# Patient Record
Sex: MALE | Race: BLACK OR AFRICAN AMERICAN | Employment: OTHER | URBAN - METROPOLITAN AREA
[De-identification: names, ages, dates, MRNs, and addresses within clinical notes are randomized per-mention and may not be internally consistent; named-entity substitution may affect disease eponyms.]

---

## 2019-10-28 ENCOUNTER — TELEPHONE ANTICOAG (OUTPATIENT)
Dept: CARDIOLOGY CLINIC | Age: 59
End: 2019-10-28

## 2020-02-02 ENCOUNTER — APPOINTMENT (OUTPATIENT)
Dept: CT IMAGING | Age: 60
End: 2020-02-02
Attending: EMERGENCY MEDICINE
Payer: MEDICARE

## 2020-02-02 ENCOUNTER — HOSPITAL ENCOUNTER (EMERGENCY)
Age: 60
Discharge: HOME | End: 2020-02-02
Attending: EMERGENCY MEDICINE | Admitting: EMERGENCY MEDICINE
Payer: MEDICARE

## 2020-02-02 VITALS
SYSTOLIC BLOOD PRESSURE: 144 MMHG | OXYGEN SATURATION: 98 % | TEMPERATURE: 97.9 F | BODY MASS INDEX: 27.77 KG/M2 | HEART RATE: 71 BPM | HEIGHT: 72 IN | RESPIRATION RATE: 17 BRPM | WEIGHT: 205.03 LBS | DIASTOLIC BLOOD PRESSURE: 84 MMHG

## 2020-02-02 DIAGNOSIS — R53.83 FATIGUE, UNSPECIFIED TYPE: Primary | ICD-10-CM

## 2020-02-02 DIAGNOSIS — R51.9 ACUTE NONINTRACTABLE HEADACHE, UNSPECIFIED HEADACHE TYPE: ICD-10-CM

## 2020-02-02 LAB
ALBUMIN SERPL-MCNC: 3.5 G/DL (ref 3.5–5)
ALBUMIN/GLOB SERPL: 1.2 {RATIO} (ref 1.1–2.2)
ALP SERPL-CCNC: 55 U/L (ref 45–117)
ALT SERPL-CCNC: 24 U/L (ref 12–78)
ANION GAP SERPL CALC-SCNC: 9 MMOL/L (ref 5–15)
APPEARANCE UR: CLEAR
AST SERPL-CCNC: 12 U/L (ref 15–37)
BACTERIA URNS QL MICRO: NEGATIVE /HPF
BASOPHILS # BLD: 0 K/UL (ref 0–0.1)
BASOPHILS NFR BLD: 0 % (ref 0–1)
BILIRUB SERPL-MCNC: 0.7 MG/DL (ref 0.2–1)
BILIRUB UR QL: NEGATIVE
BUN SERPL-MCNC: 12 MG/DL (ref 6–20)
BUN/CREAT SERPL: 9 (ref 12–20)
CALCIUM SERPL-MCNC: 8.5 MG/DL (ref 8.5–10.1)
CHLORIDE SERPL-SCNC: 104 MMOL/L (ref 97–108)
CO2 SERPL-SCNC: 25 MMOL/L (ref 21–32)
COLOR UR: ABNORMAL
COMMENT, HOLDF: NORMAL
CREAT SERPL-MCNC: 1.28 MG/DL (ref 0.7–1.3)
DIFFERENTIAL METHOD BLD: NORMAL
EOSINOPHIL # BLD: 0 K/UL (ref 0–0.4)
EOSINOPHIL NFR BLD: 0 % (ref 0–7)
EPITH CASTS URNS QL MICRO: ABNORMAL /LPF
ERYTHROCYTE [DISTWIDTH] IN BLOOD BY AUTOMATED COUNT: 13.1 % (ref 11.5–14.5)
GLOBULIN SER CALC-MCNC: 2.9 G/DL (ref 2–4)
GLUCOSE SERPL-MCNC: 276 MG/DL (ref 65–100)
GLUCOSE UR STRIP.AUTO-MCNC: 500 MG/DL
HCT VFR BLD AUTO: 38.3 % (ref 36.6–50.3)
HGB BLD-MCNC: 12.7 G/DL (ref 12.1–17)
HGB UR QL STRIP: ABNORMAL
IMM GRANULOCYTES # BLD AUTO: 0 K/UL (ref 0–0.04)
IMM GRANULOCYTES NFR BLD AUTO: 0 % (ref 0–0.5)
KETONES UR QL STRIP.AUTO: NEGATIVE MG/DL
LEUKOCYTE ESTERASE UR QL STRIP.AUTO: NEGATIVE
LYMPHOCYTES # BLD: 1.1 K/UL (ref 0.8–3.5)
LYMPHOCYTES NFR BLD: 18 % (ref 12–49)
MCH RBC QN AUTO: 29.6 PG (ref 26–34)
MCHC RBC AUTO-ENTMCNC: 33.2 G/DL (ref 30–36.5)
MCV RBC AUTO: 89.3 FL (ref 80–99)
MONOCYTES # BLD: 0.5 K/UL (ref 0–1)
MONOCYTES NFR BLD: 8 % (ref 5–13)
NEUTS SEG # BLD: 4.2 K/UL (ref 1.8–8)
NEUTS SEG NFR BLD: 74 % (ref 32–75)
NITRITE UR QL STRIP.AUTO: NEGATIVE
NRBC # BLD: 0 K/UL (ref 0–0.01)
NRBC BLD-RTO: 0 PER 100 WBC
PH UR STRIP: 6 [PH] (ref 5–8)
PLATELET # BLD AUTO: 153 K/UL (ref 150–400)
PMV BLD AUTO: 11 FL (ref 8.9–12.9)
POTASSIUM SERPL-SCNC: 3.2 MMOL/L (ref 3.5–5.1)
PROT SERPL-MCNC: 6.4 G/DL (ref 6.4–8.2)
PROT UR STRIP-MCNC: NEGATIVE MG/DL
RBC # BLD AUTO: 4.29 M/UL (ref 4.1–5.7)
RBC #/AREA URNS HPF: ABNORMAL /HPF (ref 0–5)
SAMPLES BEING HELD,HOLD: NORMAL
SODIUM SERPL-SCNC: 138 MMOL/L (ref 136–145)
SP GR UR REFRACTOMETRY: 1.01 (ref 1–1.03)
TSH SERPL DL<=0.05 MIU/L-ACNC: 0.82 UIU/ML (ref 0.36–3.74)
UR CULT HOLD, URHOLD: NORMAL
UROBILINOGEN UR QL STRIP.AUTO: 0.2 EU/DL (ref 0.2–1)
WBC # BLD AUTO: 5.8 K/UL (ref 4.1–11.1)
WBC URNS QL MICRO: ABNORMAL /HPF (ref 0–4)

## 2020-02-02 PROCEDURE — 85025 COMPLETE CBC W/AUTO DIFF WBC: CPT

## 2020-02-02 PROCEDURE — 70496 CT ANGIOGRAPHY HEAD: CPT

## 2020-02-02 PROCEDURE — 84443 ASSAY THYROID STIM HORMONE: CPT

## 2020-02-02 PROCEDURE — 99285 EMERGENCY DEPT VISIT HI MDM: CPT

## 2020-02-02 PROCEDURE — 74011250636 HC RX REV CODE- 250/636: Performed by: EMERGENCY MEDICINE

## 2020-02-02 PROCEDURE — 36415 COLL VENOUS BLD VENIPUNCTURE: CPT

## 2020-02-02 PROCEDURE — 70450 CT HEAD/BRAIN W/O DYE: CPT

## 2020-02-02 PROCEDURE — 74011636320 HC RX REV CODE- 636/320: Performed by: RADIOLOGY

## 2020-02-02 PROCEDURE — 70498 CT ANGIOGRAPHY NECK: CPT

## 2020-02-02 PROCEDURE — 81001 URINALYSIS AUTO W/SCOPE: CPT

## 2020-02-02 PROCEDURE — 74011000258 HC RX REV CODE- 258: Performed by: RADIOLOGY

## 2020-02-02 PROCEDURE — 74177 CT ABD & PELVIS W/CONTRAST: CPT

## 2020-02-02 PROCEDURE — 80053 COMPREHEN METABOLIC PANEL: CPT

## 2020-02-02 RX ORDER — SODIUM CHLORIDE 0.9 % (FLUSH) 0.9 %
10 SYRINGE (ML) INJECTION
Status: COMPLETED | OUTPATIENT
Start: 2020-02-02 | End: 2020-02-02

## 2020-02-02 RX ADMIN — IOPAMIDOL 80 ML: 755 INJECTION, SOLUTION INTRAVENOUS at 17:06

## 2020-02-02 RX ADMIN — SODIUM CHLORIDE 1000 ML: 900 INJECTION, SOLUTION INTRAVENOUS at 17:18

## 2020-02-02 RX ADMIN — Medication 10 ML: at 17:06

## 2020-02-02 RX ADMIN — SODIUM CHLORIDE 100 ML: 9 INJECTION, SOLUTION INTRAVENOUS at 17:06

## 2020-02-02 NOTE — ED PROVIDER NOTES
Mr. Antoni Rutledge is a 43-year-old male who presents to the ER with complaints of generalized fatigue. At approximately 5 PM yesterday, he got home from shopping. He bent over and stood up. He developed severe pain in left side of his head. This lasted for a part of the evening. It then resolved. He describes feeling generalized fatigue at that time. He said that the generalized fatigue has improved. He said he has had a bit of a headache 2 more times today. He thinks of the headache was on his left side. It lasted just several seconds. He also reports having generalized fatigue today as well. However, it is resolved by the time he got to the ER. He denies any fevers or chills. No nausea or vomiting. No chest pain or trouble breathing. No changes with his urine or bowel movements. He denies any numbness, tingling, or weakness. He called the nurses line, apparently from his insurance company. They recommended that he come to the ER for possible stroke. Past Medical History:   Diagnosis Date    Acute ischemic right middle cerebral artery (MCA) stroke ([facility] [address] 11/14/14    with Left HP and sesory loss. Dr. Car Suazo, neuro at [address], unspecified not elsewhere classified childhood    Allergy, unspecified not elsewhere classified childhood    food and environmental.    CAD (coronary artery disease) 4/19/2012    Dr. Mohini Dempsey.  Chickenpox childhood, adult    Chlorine gas exposure 1983    Diabetes mellitus type II, uncontrolled ([address] 12/2011    EBV positive mononucleosis syndrome 12/2011    positive titer    Heartburn     Hemiparesis affecting left side as late effect of stroke ([address] 11/14/14    worse UE>LE. Dr. Mathieu Philippe. with sensory loss.  High cholesterol     Hypertension 1985    Knee pain, left 1980    due to partial medical collateral ligament.  Shoulder pain, left 2001    due to Rotator Cuff. Shoulder Dislocation. Dr. Divya Hernández.        Past Surgical History:   Procedure Laterality Date    HX CORONARY ARTERY BYPASS GRAFT  04/30/12    Emergency. Dr. Pat Ramsay.  HX HEART CATHETERIZATION  04/30/12    Dr. Natalie Mosley MD    HX PTCA  04/30/12    Dr. Pat Ramsay.  HX SHOULDER ARTHROSCOPY  2001    Left. due to Rotator Cuff injury. Dr. Diana Pete.  HX WISDOM TEETH EXTRACTION           Family History:   Problem Relation Age of Onset    Hypertension Mother     Heart Attack Mother         massive    Diabetes Mother     Asthma Mother     Arthritis-osteo Mother     Hypertension Father     Cancer Father         stomach?  Other Father         gout    Seizures Maternal Uncle     Cancer Paternal Grandmother         ?     Hypertension Brother        Social History     Socioeconomic History    Marital status:      Spouse name: Not on file    Number of children: Not on file    Years of education: Not on file    Highest education level: Not on file   Occupational History    Not on file   Social Needs    Financial resource strain: Not on file    Food insecurity:     Worry: Not on file     Inability: Not on file    Transportation needs:     Medical: Not on file     Non-medical: Not on file   Tobacco Use    Smoking status: Former Smoker     Years: 3.00     Types: Cigarettes    Smokeless tobacco: Never Used    Tobacco comment: 1 mario per year x 3 years   Substance and Sexual Activity    Alcohol use: No     Alcohol/week: 0.0 standard drinks    Drug use: No    Sexual activity: Yes     Partners: Female     Birth control/protection: Condom   Lifestyle    Physical activity:     Days per week: Not on file     Minutes per session: Not on file    Stress: Not on file   Relationships    Social connections:     Talks on phone: Not on file     Gets together: Not on file     Attends Advent service: Not on file     Active member of club or organization: Not on file     Attends meetings of clubs or organizations: Not on file     Relationship status: Not on file    Intimate partner violence:     Fear of current or ex partner: Not on file     Emotionally abused: Not on file     Physically abused: Not on file     Forced sexual activity: Not on file   Other Topics Concern    Not on file   Social History Narrative    Not on file         ALLERGIES: Erythromycin; Nuts [tree nut]; Other medication; Peanut; and Lisinopril    Review of Systems   Constitutional: Positive for fatigue. Negative for chills and fever. HENT: Negative for rhinorrhea and sore throat. Respiratory: Negative for cough and shortness of breath. Cardiovascular: Negative for chest pain. Gastrointestinal: Negative for abdominal pain, diarrhea, nausea and vomiting. Genitourinary: Negative for dysuria and hematuria. Musculoskeletal: Negative for arthralgias and myalgias. Skin: Negative for pallor and rash. Neurological: Positive for headaches. Negative for dizziness, weakness and light-headedness. All other systems reviewed and are negative. Vitals:    02/02/20 1602   BP: 136/70   Pulse: 67   Resp: 16   Temp: 98.9 °F (37.2 °C)   SpO2: 98%   Weight: 93 kg (205 lb 0.4 oz)   Height: 6' (1.829 m)            Physical Exam     Vital signs reviewed. Nursing notes reviewed. Const:  No acute distress, well developed, well nourished  Head:  Atraumatic, normocephalic  Eyes:  PERRL, conjunctiva normal, no scleral icterus  Neck:  Supple, trachea midline  Cardiovascular:  RRR, no murmurs, no gallops, no rubs  Resp:  No resp distress, no increased work of breathing, no wheezes, no rhonchi, no rales,  Abd:  Soft, non-tender, non-distended, no rebound, no guarding, no CVA tenderness  MSK:  No pedal edema, normal ROM  Neuro:  Alert and oriented x3, no cranial nerve defect, 4+ out of 5 left upper extremity and left lower extremity strength (pt.  Reports that this is his baseline), 5 out of 5 right upper and right lower extremity strength  Skin:  Warm, dry, intact  Psych: normal mood and affect, behavior is normal, judgement and thought content is normal          MDM  Number of Diagnoses or Management Options  Acute nonintractable headache, unspecified headache type: Fatigue, unspecified type:      Amount and/or Complexity of Data Reviewed  Clinical lab tests: ordered and reviewed  Tests in the radiology section of CPT®: ordered and reviewed  Review and summarize past medical records: yes    Patient Progress  Patient progress: stable            Mr. Antoni Rutledge is a 68-year-old male who presents to the ER with complaints of headache and fatigue. He is well-appearing in the ER. He states that all the symptoms have resolved. He denies any focal deficits to suggest stroke. No signs of infection. He states that he has been eating a low-carb diet and has lost 20 to 30 pounds in last 3 months. He thinks that this may be related to his symptoms. No bleed or acute findings on CT of his head and CTA. No acute findings on his CT of his abdomen to explain his symptoms. I did have a long conversation with him pertaining to sittings findings of inflammatory changes. He said he feels completely back to normal at the time of discharge. He has no abdominal pain or no other complaints. He was given strict instructions to follow-up with his primary care doctor or return to the ER with any new or worsening symptoms.       Procedures

## 2020-02-02 NOTE — ED TRIAGE NOTES
Pt ambulatory to ED with c/o headache and left sided neck pain onset 1100 yesterday. Headache onset 1700. Pt reports continued headache when leaning down. Headache relieved while upright. Pt has left sided deficit from previous stroke in 2014.  Reports left sided weakness same as usual. \

## 2020-02-03 NOTE — ED NOTES
Discharge instructions given to pt by RN in teach back method and verbalizes understanding. Opportunity for questions provided.  Pt ambulatory out of unit, in no acute distress and taken home by room mate

## 2023-08-25 ENCOUNTER — HOSPITAL ENCOUNTER (EMERGENCY)
Facility: HOSPITAL | Age: 63
Discharge: HOME | End: 2023-08-25
Attending: STUDENT IN AN ORGANIZED HEALTH CARE EDUCATION/TRAINING PROGRAM
Payer: COMMERCIAL

## 2023-08-25 VITALS
RESPIRATION RATE: 15 BRPM | OXYGEN SATURATION: 99 % | SYSTOLIC BLOOD PRESSURE: 145 MMHG | TEMPERATURE: 98.2 F | HEART RATE: 61 BPM | DIASTOLIC BLOOD PRESSURE: 93 MMHG

## 2023-08-25 DIAGNOSIS — R53.1 GENERAL WEAKNESS: Primary | ICD-10-CM

## 2023-08-25 LAB
ALBUMIN SERPL-MCNC: 3.5 G/DL (ref 3.5–5)
ALBUMIN/GLOB SERPL: 1.1 (ref 1.1–2.2)
ALP SERPL-CCNC: 58 U/L (ref 45–117)
ALT SERPL-CCNC: 30 U/L (ref 12–78)
ANION GAP SERPL CALC-SCNC: 5 MMOL/L (ref 5–15)
AST SERPL-CCNC: 46 U/L (ref 15–37)
BASOPHILS # BLD: 0 K/UL (ref 0–0.1)
BASOPHILS NFR BLD: 0 % (ref 0–1)
BILIRUB SERPL-MCNC: 0.9 MG/DL (ref 0.2–1)
BUN SERPL-MCNC: 20 MG/DL (ref 6–20)
BUN/CREAT SERPL: 16 (ref 12–20)
CALCIUM SERPL-MCNC: 8.7 MG/DL (ref 8.5–10.1)
CHLORIDE SERPL-SCNC: 109 MMOL/L (ref 97–108)
CO2 SERPL-SCNC: 22 MMOL/L (ref 21–32)
COMMENT:: NORMAL
CREAT SERPL-MCNC: 1.25 MG/DL (ref 0.7–1.3)
DIFFERENTIAL METHOD BLD: ABNORMAL
EKG ATRIAL RATE: 51 BPM
EKG ATRIAL RATE: 58 BPM
EKG DIAGNOSIS: NORMAL
EKG DIAGNOSIS: NORMAL
EKG P AXIS: 64 DEGREES
EKG P AXIS: 74 DEGREES
EKG P-R INTERVAL: 142 MS
EKG P-R INTERVAL: 148 MS
EKG Q-T INTERVAL: 440 MS
EKG Q-T INTERVAL: 450 MS
EKG QRS DURATION: 110 MS
EKG QRS DURATION: 112 MS
EKG QTC CALCULATION (BAZETT): 414 MS
EKG QTC CALCULATION (BAZETT): 431 MS
EKG R AXIS: 24 DEGREES
EKG R AXIS: 32 DEGREES
EKG T AXIS: 19 DEGREES
EKG T AXIS: 76 DEGREES
EKG VENTRICULAR RATE: 51 BPM
EKG VENTRICULAR RATE: 58 BPM
EOSINOPHIL # BLD: 0.1 K/UL (ref 0–0.4)
EOSINOPHIL NFR BLD: 3 % (ref 0–7)
ERYTHROCYTE [DISTWIDTH] IN BLOOD BY AUTOMATED COUNT: 14 % (ref 11.5–14.5)
GLOBULIN SER CALC-MCNC: 3.3 G/DL (ref 2–4)
GLUCOSE SERPL-MCNC: 127 MG/DL (ref 65–100)
HCT VFR BLD AUTO: 45.4 % (ref 36.6–50.3)
HGB BLD-MCNC: 15.1 G/DL (ref 12.1–17)
IMM GRANULOCYTES # BLD AUTO: 0 K/UL (ref 0–0.04)
IMM GRANULOCYTES NFR BLD AUTO: 0 % (ref 0–0.5)
LYMPHOCYTES # BLD: 0.9 K/UL (ref 0.8–3.5)
LYMPHOCYTES NFR BLD: 38 % (ref 12–49)
MCH RBC QN AUTO: 30.1 PG (ref 26–34)
MCHC RBC AUTO-ENTMCNC: 33.3 G/DL (ref 30–36.5)
MCV RBC AUTO: 90.4 FL (ref 80–99)
MONOCYTES # BLD: 0.4 K/UL (ref 0–1)
MONOCYTES NFR BLD: 18 % (ref 5–13)
NEUTS SEG # BLD: 1 K/UL (ref 1.8–8)
NEUTS SEG NFR BLD: 41 % (ref 32–75)
NRBC # BLD: 0 K/UL (ref 0–0.01)
NRBC BLD-RTO: 0 PER 100 WBC
PLATELET # BLD AUTO: 121 K/UL (ref 150–400)
PMV BLD AUTO: 10.5 FL (ref 8.9–12.9)
POTASSIUM SERPL-SCNC: 4 MMOL/L (ref 3.5–5.1)
POTASSIUM SERPL-SCNC: 5.9 MMOL/L (ref 3.5–5.1)
PROT SERPL-MCNC: 6.8 G/DL (ref 6.4–8.2)
RBC # BLD AUTO: 5.02 M/UL (ref 4.1–5.7)
RBC MORPH BLD: ABNORMAL
SODIUM SERPL-SCNC: 136 MMOL/L (ref 136–145)
SPECIMEN HOLD: NORMAL
TROPONIN I SERPL HS-MCNC: 14 NG/L (ref 0–76)
WBC # BLD AUTO: 2.4 K/UL (ref 4.1–11.1)

## 2023-08-25 PROCEDURE — 36415 COLL VENOUS BLD VENIPUNCTURE: CPT

## 2023-08-25 PROCEDURE — 96360 HYDRATION IV INFUSION INIT: CPT

## 2023-08-25 PROCEDURE — 2580000003 HC RX 258: Performed by: STUDENT IN AN ORGANIZED HEALTH CARE EDUCATION/TRAINING PROGRAM

## 2023-08-25 PROCEDURE — 96361 HYDRATE IV INFUSION ADD-ON: CPT

## 2023-08-25 PROCEDURE — 80053 COMPREHEN METABOLIC PANEL: CPT

## 2023-08-25 PROCEDURE — 84484 ASSAY OF TROPONIN QUANT: CPT

## 2023-08-25 PROCEDURE — 85025 COMPLETE CBC W/AUTO DIFF WBC: CPT

## 2023-08-25 PROCEDURE — 99284 EMERGENCY DEPT VISIT MOD MDM: CPT

## 2023-08-25 PROCEDURE — 84132 ASSAY OF SERUM POTASSIUM: CPT

## 2023-08-25 PROCEDURE — 93005 ELECTROCARDIOGRAM TRACING: CPT | Performed by: STUDENT IN AN ORGANIZED HEALTH CARE EDUCATION/TRAINING PROGRAM

## 2023-08-25 RX ORDER — SODIUM CHLORIDE 9 MG/ML
INJECTION, SOLUTION INTRAVENOUS ONCE
Status: COMPLETED | OUTPATIENT
Start: 2023-08-25 | End: 2023-08-25

## 2023-08-25 RX ADMIN — SODIUM CHLORIDE: 9 INJECTION, SOLUTION INTRAVENOUS at 13:49

## 2023-08-25 NOTE — CARE COORDINATION
CM notes CM Consult and received call regarding discharge transportation needs. Nursing confirmed that patient is ambulatory, preferred discharge location address, that patient will have access to the discharge location/someone will be available to receive the patient at the discharge location. CM submitted transport request to [facility] for a Lyft transport with  from the Appropriate Entrance, for an estimated cost of $10-14    Transport updates to be provided to Appropriate Unit and/or Patient Registration by phone. CM Team to continue following as needed.     CRM: Negrita Chaudhry, MPH, [address] [place] [address] St: [phone]

## 2023-08-25 NOTE — ED PROVIDER NOTES
Albumin 3.5   Globulin 3.3   ALBUMIN/GLOBULIN RATIO 1.1 [SL]   1530 Sinus bradycardia, 51 bpm, no acute ischemic changes, no STEMI [SL]   1714 Potassium:    Potassium 4.0  Repeat Potassium within normal limits [SL]   1715 EKG 12 Lead [SL]   1715 Troponin:    Troponin, High Sensitivity 14 [SL]   1715 EKG 12 Lead  Normal sinus rhythm, no acute ischemic changes, no significant change compared to prior [SL]      ED Course User Index  [SL] Ophelia Camarillo MD           CONSULTS:  None    PROCEDURES:  Unless otherwise noted below, none     Procedures          FINAL IMPRESSION      1.  General weakness            DISPOSITION/PLAN   DISPOSITION Decision To Discharge 08/25/2023 05:14:52 PM      PATIENT REFERRED TO:  Your PCP    Call today        DISCHARGE MEDICATIONS:  New Prescriptions    No medications on file         (Please note that portions of this note were completed with a voice recognition program.  Efforts were made to edit the dictations but occasionally words are mis-transcribed.)    Ophelia Camarillo MD (electronically signed)  Emergency Attending Physician               Ophelia Camarillo MD  08/25/23 [id number]

## 2023-08-25 NOTE — ED NOTES
Discharge instructions reviewed with pt.  Pt verbalized understanding      Kateryna Caceres RN  08/25/23 [id number]

## 2023-08-25 NOTE — ED TRIAGE NOTES
Pt arrives via EMS due to coworker reporting that pt was not responding normally. Pt has hx of stroke with paralysis on L side. Pt HR with EMS was between  with BP 120s/80. Pt Ax4 with new generalized weakness.

## 2023-08-25 NOTE — DISCHARGE INSTRUCTIONS
You are seen in the emergency department today for fatigue and generalized weakness. Your evaluation today has been reassuring, you are advised to follow-up with your primary care doctor for further evaluation and testing. You should discuss a Holter monitor with your cardiologist or primary care doctor given report of palpitations. Please ensure that you are eating and drinking enough every day. Monitor your oral intake with a diary to help you keep track! Please return to the emergency department if you have chest pain, shortness of breath, lightheadedness, fainting, vision loss, severe headache or any other new or concerning symptoms.   Thank you for letting us take care of you, hope you feel better soon,  Michelle Mc MD

## 2023-08-28 LAB
EKG ATRIAL RATE: 51 BPM
EKG ATRIAL RATE: 58 BPM
EKG DIAGNOSIS: NORMAL
EKG DIAGNOSIS: NORMAL
EKG P AXIS: 64 DEGREES
EKG P AXIS: 74 DEGREES
EKG P-R INTERVAL: 142 MS
EKG P-R INTERVAL: 148 MS
EKG Q-T INTERVAL: 440 MS
EKG Q-T INTERVAL: 450 MS
EKG QRS DURATION: 110 MS
EKG QRS DURATION: 112 MS
EKG QTC CALCULATION (BAZETT): 414 MS
EKG QTC CALCULATION (BAZETT): 431 MS
EKG R AXIS: 24 DEGREES
EKG R AXIS: 32 DEGREES
EKG T AXIS: 19 DEGREES
EKG T AXIS: 76 DEGREES
EKG VENTRICULAR RATE: 51 BPM
EKG VENTRICULAR RATE: 58 BPM

## 2024-01-26 NOTE — DISCHARGE INSTRUCTIONS
Patient Education        Fatigue: Care Instructions  Your Care Instructions    Fatigue is a feeling of tiredness, exhaustion, or lack of energy. You may feel fatigue because of too much or not enough activity. It can also come from stress, lack of sleep, boredom, and poor diet. Many medical problems, such as viral infections, can cause fatigue. Emotional problems, especially depression, are often the cause of fatigue. Fatigue is most often a symptom of another problem. Treatment for fatigue depends on the cause. For example, if you have fatigue because you have a certain health problem, treating this problem also treats your fatigue. If depression or anxiety is the cause, treatment may help. Follow-up care is a key part of your treatment and safety. Be sure to make and go to all appointments, and call your doctor if you are having problems. It's also a good idea to know your test results and keep a list of the medicines you take. How can you care for yourself at home? · Get regular exercise. But don't overdo it. Go back and forth between rest and exercise. · Get plenty of rest.  · Eat a healthy diet. Do not skip meals, especially breakfast.  · Reduce your use of caffeine, tobacco, and alcohol. Caffeine is most often found in coffee, tea, cola drinks, and chocolate. · Limit medicines that can cause fatigue. This includes tranquilizers and cold and allergy medicines. When should you call for help? Watch closely for changes in your health, and be sure to contact your doctor if:    · You have new symptoms such as fever or a rash.     · Your fatigue gets worse.     · You have been feeling down, depressed, or hopeless. Or you may have lost interest in things that you usually enjoy.     · You are not getting better as expected. Where can you learn more? Go to http://hector-mariam.info/. Enter N951 in the search box to learn more about \"Fatigue: Care Instructions. \"  Current as of: June 26, Subjective:  History of Present Illness:    Patient is a 67-year-old male here today for fever and decreased mental status.  He was brought from home via EMS reporting worsening mental status as the day progressed and a fever up to 102.4.  Family reports that the patient had a prostate biopsy yesterday in office and seemed to be okay yesterday however did change today.  Patient is alert to his name and can answer questions appropriately but appears lethargic.  Is very weak.      Nurses Notes reviewed and agree, including vitals, allergies, social history and prior medical history.     REVIEW OF SYSTEMS: All systems reviewed and not pertinent unless noted.  Review of Systems    Past Medical History:   Diagnosis Date    6th nerve palsy, right     right eye     Anxiety and depression     Atrial fibrillation, persistent 12/02/2020    on Xarelto    Coronary artery disease involving native coronary artery of native heart without angina pectoris 09/12/2018    follows w/ Dr. Mendoza    CRI (chronic renal insufficiency), stage 2 (mild)     CVA (cerebral vascular accident)     Diabetes mellitus     doesnt check sugar, type 2     Disease of thyroid gland     Double vision     resolved- right eye    Elevated cholesterol     Elevated prostate specific antigen (PSA) 11/08/2023    Focal seizure     of right arm     Heart attack     NSTEMI 2015, s/p stenting    History of Helicobacter pylori infection     in 2008, treated w/ PrevPak - 2021 EGD bx (+), PCP RX - PPI/Augmentin/Doxy/Flagyl (x 14 days) 1/22/21    HL (hearing loss)     (L) ear - child luevano measles    Hyperlipidemia     Hypertension     Kidney stone     Memory loss 2005    d/t meningitis    Meningitis 2005    unsure of bacterial or viral     Obesity     Sleep apnea treated with nocturnal BiPAP     compliant with  machine     Stroke     2017/2018    Ventricular tachycardia     3 different times    Wears glasses        Allergies:    Statins      Past Surgical History:    Procedure Laterality Date    CARDIAC CATHETERIZATION N/A 10/12/2018    Procedure: Left Heart Cath;  Surgeon: Yoselin Johnson MD;  Location:  [facility];  Service: Cardiology    CARDIAC CATHETERIZATION  2021    stent    CARDIAC CATHETERIZATION  2021    just checking the after pacemaker placed- Dr. Tim    CARDIAC DEFIBRILLATOR PLACEMENT      COLONOSCOPY  10/2020    w/ Dr. Jacobs, hx colon polyps    CORONARY STENT PLACEMENT      LAPAROSCOPIC GASTRIC BANDING      s/p LAGB Realize 2008 by HOMERO.    PACEMAKER IMPLANTATION  2021    UMBILICAL HERNIA REPAIR      incarcerated UHR w/ mesh by Dr. Velasquez    URETEROSCOPY LASER LITHOTRIPSY WITH STENT INSERTION Left 10/20/2021    Procedure: URETEROSCOPY LASER LITHOTRIPSY WITH STENT INSERTION;  Surgeon: Tonio De La Cruz MD;  Location: [facility];  Service: Urology;  Laterality: Left;    URETEROSCOPY LASER LITHOTRIPSY WITH STENT INSERTION Left 2021    Procedure: URETEROSCOPY LEFT, LEFT RETROGRADE PYELOGRAM, CYSTOSCOPY, LASER LITHOTRIPSY WITH STENT EXCHANGE;  Surgeon: Tonio De La Cruz MD;  Location: [facility] OR;  Service: Urology;  Laterality: Left;    URETEROSCOPY LASER LITHOTRIPSY WITH STENT INSERTION Left 10/06/2022    Procedure: URETEROSCOPY LASER LITHOTRIPSY WITH STENT INSERTION;  Surgeon: Tonio De La Cruz MD;  Location: [facility] OR;  Service: Urology;  Laterality: Left;         Social History     Socioeconomic History    Marital status:    Tobacco Use    Smoking status: Former     Packs/day: 0.50     Years: 15.00     Additional pack years: 0.00     Total pack years: 7.50     Types: Cigarettes     Start date: 1975     Quit date: 1985     Years since quittin.0    Smokeless tobacco: Never   Vaping Use    Vaping Use: Never used   Substance and Sexual Activity    Alcohol use: No    Drug use: No    Sexual activity: Defer         Family History   Problem Relation Age of Onset    Stroke Mother  2019  Content Version: 12.2  © 5531-2425 [employer]. Care instructions adapted under license by [employer] (which disclaims liability or warranty for this information). If you have questions about a medical condition or this instruction, always ask your healthcare professional. [address] any warranty or liability for your use of this information. Patient Education        Headache: Care Instructions  Your Care Instructions    Headaches have many possible causes. Most headaches aren't a sign of a more serious problem, and they will get better on their own. Home treatment may help you feel better faster. The doctor has checked you carefully, but problems can develop later. If you notice any problems or new symptoms, get medical treatment right away. Follow-up care is a key part of your treatment and safety. Be sure to make and go to all appointments, and call your doctor if you are having problems. It's also a good idea to know your test results and keep a list of the medicines you take. How can you care for yourself at home? · Do not drive if you have taken a prescription pain medicine. · Rest in a quiet, dark room until your headache is gone. Close your eyes and try to relax or go to sleep. Don't watch TV or read. · Put a cold, moist cloth or cold pack on the painful area for 10 to 20 minutes at a time. Put a thin cloth between the cold pack and your skin. · Use a warm, moist towel or a heating pad set on low to relax tight shoulder and neck muscles. · Have someone gently massage your neck and shoulders. · Take pain medicines exactly as directed. ? If the doctor gave you a prescription medicine for pain, take it as prescribed. ? If you are not taking a prescription pain medicine, ask your doctor if you can take an over-the-counter medicine.   · Be careful not to take pain medicine more often than the instructions allow, because you may get worse or "    Hypertension Mother     COPD Father     Hypertension Father        Objective  Physical Exam:  /68 (BP Location: Left arm, Patient Position: Lying)   Pulse 76   Temp 97.6 °F (36.4 °C) (Oral)   Resp (!) 30   Ht 188 cm (74\")   Wt (!) 141 kg (311 lb 1.1 oz)   SpO2 94%   BMI 39.94 kg/m²      Physical Exam  Vitals and nursing note reviewed.   Constitutional:       Appearance: Normal appearance. He is obese. He is ill-appearing.   HENT:      Head: Normocephalic and atraumatic.      Right Ear: Tympanic membrane, ear canal and external ear normal.      Left Ear: Tympanic membrane, ear canal and external ear normal.      Nose: Nose normal.      Mouth/Throat:      Mouth: Mucous membranes are moist.      Pharynx: Oropharynx is clear.   Eyes:      Extraocular Movements: Extraocular movements intact.      Conjunctiva/sclera: Conjunctivae normal.      Pupils: Pupils are equal, round, and reactive to light.   Neck:      Vascular: No carotid bruit.   Cardiovascular:      Rate and Rhythm: Regular rhythm. Tachycardia present.      Pulses: Normal pulses.      Heart sounds: Normal heart sounds.   Pulmonary:      Effort: Pulmonary effort is normal.      Breath sounds: Normal breath sounds.   Abdominal:      General: Abdomen is flat. Bowel sounds are normal. There is no distension.      Palpations: Abdomen is soft.      Tenderness: There is no abdominal tenderness.   Musculoskeletal:      Cervical back: Neck supple. No tenderness.      Right lower leg: No edema.      Left lower leg: No edema.   Lymphadenopathy:      Cervical: No cervical adenopathy.   Skin:     General: Skin is warm and dry.      Capillary Refill: Capillary refill takes less than 2 seconds.   Neurological:      General: No focal deficit present.      Mental Status: He is oriented to person, place, and time. He is lethargic.      GCS: GCS eye subscore is 3. GCS verbal subscore is 5. GCS motor subscore is 6.   Psychiatric:         Mood and Affect: Mood " more frequent headaches when the medicine wears off. · Do not ignore new symptoms that occur with a headache, such as a fever, weakness or numbness, vision changes, or confusion. These may be signs of a more serious problem. To prevent headaches  · Keep a headache diary so you can figure out what triggers your headaches. Avoiding triggers may help you prevent headaches. Record when each headache began, how long it lasted, and what the pain was like (throbbing, aching, stabbing, or dull). Write down any other symptoms you had with the headache, such as nausea, flashing lights or dark spots, or sensitivity to bright light or loud noise. Note if the headache occurred near your period. List anything that might have triggered the headache, such as certain foods (chocolate, cheese, wine) or odors, smoke, bright light, stress, or lack of sleep. · Find healthy ways to deal with stress. Headaches are most common during or right after stressful times. Take time to relax before and after you do something that has caused a headache in the past.  · Try to keep your muscles relaxed by keeping good posture. Check your jaw, face, neck, and shoulder muscles for tension, and try relaxing them. When sitting at a desk, change positions often, and stretch for 30 seconds each hour. · Get plenty of sleep and exercise. · Eat regularly and well. Long periods without food can trigger a headache. · Treat yourself to a massage. Some people find that regular massages are very helpful in relieving tension. · Limit caffeine by not drinking too much coffee, tea, or soda. But don't quit caffeine suddenly, because that can also give you headaches. · Reduce eyestrain from computers by blinking frequently and looking away from the computer screen every so often. Make sure you have proper eyewear and that your monitor is set up properly, about an arm's length away. · Seek help if you have depression or anxiety.  Your headaches may be linked to normal.         Behavior: Behavior normal.         Procedures    ED Course:    ED Course as of 01/27/24 0248 Fri Jan 26, 2024 1930 EKG interpreted by me reveals a paced rhythm with rate of 100 bpm.  There is a left bundle branch block pattern.  Similar to previous.  This is an abnormal appearing EKG. [TB]      ED Course User Index  [TB] Xochitl Chatterjee MD       Lab Results (last 24 hours)       Procedure Component Value Units Date/Time    TISSUE EXAM, [facility] ([facility]) [[id number]] Collected: 01/26/24 1030    Specimen: Tissue Updated: 01/26/24 1030    CBC & Differential [[id number]]  (Abnormal) Collected: 01/26/24 1921    Specimen: Blood Updated: 01/1956    Narrative:      The following orders were created for panel order CBC & Differential.  Procedure                               Abnormality         Status                     ---------                               -----------         ------                     CBC Auto Differential[[id number]]        Abnormal            Final result               Scan Slide[[id number]]                                                                    Please view results for these tests on the individual orders.    Comprehensive Metabolic Panel [[id number]]  (Abnormal) Collected: 01/26/24 1921    Specimen: Blood Updated: 01/26/24 2008     Glucose 107 mg/dL      BUN 16 mg/dL      Creatinine 1.70 mg/dL      Sodium 141 mmol/L      Potassium 3.6 mmol/L      Chloride 100 mmol/L      CO2 26.1 mmol/L      Calcium 9.2 mg/dL      Total Protein 8.1 g/dL      Albumin 4.3 g/dL      ALT (SGPT) 16 U/L      AST (SGOT) 18 U/L      Alkaline Phosphatase 97 U/L      Total Bilirubin 1.2 mg/dL      Globulin 3.8 gm/dL      A/G Ratio 1.1 g/dL      BUN/Creatinine Ratio 9.4     Anion Gap 14.9 mmol/L      eGFR 43.4 mL/min/1.73     Narrative:      GFR Normal >60  Chronic Kidney Disease <60  Kidney Failure <15      Lactic Acid, Plasma [[id number]]  (Abnormal) Collected: 01/26/24 1921     these conditions. Treatment can both prevent headaches and help with symptoms of anxiety or depression. When should you call for help? Call 911 anytime you think you may need emergency care. For example, call if:    · You have signs of a stroke. These may include:  ? Sudden numbness, paralysis, or weakness in your face, arm, or leg, especially on only one side of your body. ? Sudden vision changes. ? Sudden trouble speaking. ? Sudden confusion or trouble understanding simple statements. ? Sudden problems with walking or balance. ? A sudden, severe headache that is different from past headaches.    Call your doctor now or seek immediate medical care if:    · You have a new or worse headache.     · Your headache gets much worse. Where can you learn more? Go to http://hector-mariam.info/. Enter M271 in the search box to learn more about \"Headache: Care Instructions. \"  Current as of: March 28, 2019  Content Version: 12.2  © 9713-6253 [employer], [employer]. Care instructions adapted under license by [employer] (which disclaims liability or warranty for this information). If you have questions about a medical condition or this instruction, always ask your healthcare professional. [place] [address] any warranty or liability for your use of this information. "Specimen: Blood Updated: 01/26/24 2016     Lactate 3.8 mmol/L     CBC Auto Differential [[id number]]  (Abnormal) Collected: 01/26/24 1921    Specimen: Blood Updated: 01/1956     WBC 3.59 10*3/mm3      RBC 5.96 10*6/mm3      Hemoglobin 18.2 g/dL      Hematocrit 53.7 %      MCV 90.1 fL      MCH 30.5 pg      MCHC 33.9 g/dL      RDW 14.9 %      RDW-SD 49.3 fl      MPV 10.2 fL      Platelets 127 10*3/mm3      Neutrophil % 93.5 %      Lymphocyte % 4.5 %      Monocyte % 0.3 %      Eosinophil % 0.3 %      Basophil % 0.6 %      Immature Grans % 0.8 %      Neutrophils, Absolute 3.36 10*3/mm3      Lymphocytes, Absolute 0.16 10*3/mm3      Monocytes, Absolute 0.01 10*3/mm3      Eosinophils, Absolute 0.01 10*3/mm3      Basophils, Absolute 0.02 10*3/mm3      Immature Grans, Absolute 0.03 10*3/mm3      nRBC 0.0 /100 WBC     Procalcitonin [[id number]]  (Abnormal) Collected: 01/26/24 1921    Specimen: Blood Updated: 01/26/24 2037     Procalcitonin 4.22 ng/mL     Narrative:      As a Marker for Sepsis (Non-Neonates):    1. <0.5 ng/mL represents a low risk of severe sepsis and/or septic shock.  2. >2 ng/mL represents a high risk of severe sepsis and/or septic shock.    As a Marker for Lower Respiratory Tract Infections that require antibiotic therapy:    PCT on Admission    Antibiotic Therapy       6-12 Hrs later    >0.5                Strongly Recommended  >0.25 - <0.5        Recommended   0.1 - 0.25          Discouraged              Remeasure/reassess PCT  <0.1                Strongly Discouraged     Remeasure/reassess PCT    As 28 day mortality risk marker: \"Change in Procalcitonin Result\" (>80% or <=80%) if Day 0 (or Day 1) and Day 4 values are available. Refer to http://www.[employer]-pct-calculator.com    Change in PCT <=80%  A decrease of PCT levels below or equal to 80% defines a positive change in PCT test result representing a higher risk for 28-day all-cause mortality of patients diagnosed with severe sepsis for septic " shock.    Change in PCT >80%  A decrease of PCT levels of more than 80% defines a negative change in PCT result representing a lower risk for 28-day all-cause mortality of patients diagnosed with severe sepsis or septic shock.       COVID-19 and FLU A/B PCR, 1 HR TAT - Swab, Nasopharynx [[id number]]  (Normal) Collected: 01/26/24 2035    Specimen: Swab from Nasopharynx Updated: 01/26/24 2058     COVID19 Not Detected     Influenza A PCR Not Detected     Influenza B PCR Not Detected    Narrative:      Fact sheet for providers: https://www.fda.gov/media/075973/download    Fact sheet for patients: https://www.fda.gov/media/275311/download    Test performed by PCR.    Blood Culture With ECHO - Blood, Arm, Right [[id number]] Collected: 01/26/24 2039    Specimen: Blood from Arm, Right Updated: 01/26/24 2039    Blood Culture With ECHO - Blood, Arm, Left [[id number]] Collected: 01/26/24 2039    Specimen: Blood from Arm, Left Updated: 01/26/24 2039    Urinalysis With Culture If Indicated - Indwelling Urethral Catheter [[id number]]  (Abnormal) Collected: 01/26/24 2103    Specimen: Urine from Indwelling Urethral Catheter Updated: 01/26/24 2126     Color, UA Red     Appearance, UA Turbid     pH, UA 6.0     Specific Gravity, UA 1.020     Glucose,  mg/dL (2+)     Ketones, UA Negative     Bilirubin, UA Moderate (2+)     Blood, UA Large (3+)     Protein,  mg/dL (2+)     Leuk Esterase, UA Large (3+)     Nitrite, UA Positive     Urobilinogen, UA 0.2 E.U./dL    Narrative:      In absence of clinical symptoms, the presence of pyuria, bacteria, and/or nitrites on the urinalysis result does not correlate with infection.    Urinalysis, Microscopic Only - Indwelling Urethral Catheter [[id number]]  (Abnormal) Collected: 01/26/24 2103    Specimen: Urine from Indwelling Urethral Catheter Updated: 01/26/24 2128     RBC, UA 6-10 /HPF      WBC, UA 21-50 /HPF      Bacteria, UA 2+ /HPF      Squamous Epithelial Cells, UA 7-12 /HPF      Hyaline  Casts, UA None Seen /LPF      Methodology Manual Light Microscopy    Urine Culture - Urine, Indwelling Urethral Catheter [[id number]] Collected: 01/26/24 2103    Specimen: Urine from Indwelling Urethral Catheter Updated: 01/26/24 2128    STAT Lactic Acid, Reflex [[id number]]  (Abnormal) Collected: 01/26/24 2313    Specimen: Blood Updated: 01/26/24 2356     Lactate 3.2 mmol/L     STAT Lactic Acid, Reflex [[id number]]  (Abnormal) Collected: 01/27/24 0203    Specimen: Blood Updated: 01/27/24 0231     Lactate 2.8 mmol/L              CT Abdomen Pelvis Without Contrast    Result Date: 1/26/2024  FINAL REPORT TECHNIQUE: Axial CT images were performed from the lung bases through the symphysis pubis without IV contrast.  This study was performed with techniques to keep radiation doses as low as reasonably achievable (ALARA). Individualized dose reduction techniques using automated exposure control or adjustment of mA and/or kV according to the patient's size were employed. CLINICAL HISTORY: Abdominal pain, post-op; prostate biopsy yesterday, woke today with weakness and sepsis symptoms FINDINGS: Lack of intravenous contrast limits evaluation of solid abdominal and pelvic organs.  LOWER CHEST: The heart is normal size.  The lung bases are clear.  ABDOMEN/PELVIS:  Liver, gallbladder and bile ducts: The unenhanced liver is grossly unremarkable without focal abnormality. there are gallstones without cholecystitis.  There is no definite biliary duct dilatation.  Adrenal glands: The adrenal glands are morphologically unremarkable without suspicious lesion. Kidneys, ureter and urinary bladder: No nephrolithiasis.  There are nonobstructing bilateral kidney stones.  There is urinary bladder wall thickening.  Spleen: The spleen is normal size. Pancreas: The pancreas is grossly unremarkable.  GI systems and mesentery: A gastric banding device is noted.  No evidence of bowel obstruction.  The appendix is not visualized, but there is no  sign of appendicitis.  No significant mesenteric inflammation.  Lymph nodes: No definite pathologically enlarged abdominal or pelvic lymph nodes present within the limits of this noncontrast enhanced exam.  Vessels: The abdominal aorta is normal in caliber.  The inferior vena cava is unremarkable. Peritoneum: No free intraperitoneal fluid or pneumoperitoneum. Pelvic viscera: A Crespo catheter is inflated within the prostate gland.  Body wall: No body wall contusion.  Bones: No acute fracture.     Impression: 1.  Malpositioned Crespo catheter with balloon inflated in the prostate gland. Authenticated and Electronically Signed by Jose J Dupree MD on 01/26/2024 11:38:21 PM        [facility]     Amount and/or Complexity of Data Reviewed  Clinical lab tests: reviewed  Tests in the medicine section of CPT®: reviewed        Initial impression of presenting illness: Patient is a 67-year-old male here today with fever, hematuria, and altered mental status    DDX: includes but is not limited to: Sepsis, postop bleeding, pneumonia, among others    Patient arrives tachycardic, febrile with vitals interpreted by myself.     Pertinent features from physical exam: lethargic, tachycardia, tachypnea, GCS of 14, hematuria noted on urinal.    Initial diagnostic plan: CBC, CMP, lactate, procalcitonin, COVID/influenza swab, blood cultures, urinalysis, chest x-ray, EKG, and CT abdomen and pelvis    Results from initial plan were reviewed and interpreted by me revealing white blood count of 3.5, lactate of 3.8, procalcitonin of 4.22, and a creatinine of 1.7.  Urine appears to be grossly infected with positive nitrites, 21-50 white blood cells, 6-10 red blood cells, and 2+ bacteria.  He is negative for COVID and influenza.  CT scan does not show any acute process.    Diagnostic information from other sources: Medical record    Interventions / Re-evaluation: Patient was given IV fluids and had a catheter placed.  Also was given Tylenol for fever and  fentanyl for pain control.    Results/clinical rationale were discussed with Dr. Chatterjee who spoke with Dr. De La Cruz as he was notified by the family.  Provided recommendations on having the patient admitted to the hospitalist and to give ertapenem.  Will see patient over the weekend.    Consultations/Discussion of results with other physicians: Discussed the patient with Dr. Cohen and advised him on the plan set by Dr. De La Cruz.  Excepted patient for admission.    Disposition plan: Patient admitted to the hospital for further care and management.  -----        Final diagnoses:   Sepsis without acute organ dysfunction, due to unspecified organism   Acute cystitis with hematuria          Jake Collins, APRN  01/27/24 0248